# Patient Record
Sex: FEMALE | Race: WHITE | Employment: FULL TIME | URBAN - METROPOLITAN AREA
[De-identification: names, ages, dates, MRNs, and addresses within clinical notes are randomized per-mention and may not be internally consistent; named-entity substitution may affect disease eponyms.]

---

## 2017-02-06 ENCOUNTER — ANESTHESIA EVENT (OUTPATIENT)
Dept: OBGYN | Facility: CLINIC | Age: 37
End: 2017-02-06
Payer: COMMERCIAL

## 2017-02-06 ENCOUNTER — ANESTHESIA (OUTPATIENT)
Dept: OBGYN | Facility: CLINIC | Age: 37
End: 2017-02-06
Payer: COMMERCIAL

## 2017-02-06 PROBLEM — Z98.891 S/P CESAREAN SECTION: Status: ACTIVE | Noted: 2017-02-06

## 2017-02-06 PROCEDURE — 37000011 ZZH ANESTHESIA WARD SERVICE

## 2017-02-06 PROCEDURE — 25000125 ZZHC RX 250: Performed by: ANESTHESIOLOGY

## 2017-02-06 PROCEDURE — 25000128 H RX IP 250 OP 636: Performed by: OBSTETRICS & GYNECOLOGY

## 2017-02-06 PROCEDURE — 25000125 ZZHC RX 250: Performed by: NURSE ANESTHETIST, CERTIFIED REGISTERED

## 2017-02-06 PROCEDURE — 25000128 H RX IP 250 OP 636: Performed by: NURSE ANESTHETIST, CERTIFIED REGISTERED

## 2017-02-06 PROCEDURE — 25800025 ZZH RX 258: Performed by: OBSTETRICS & GYNECOLOGY

## 2017-02-06 PROCEDURE — 25000134 H RX MED IP 250 OP 636 PS 250: Performed by: NURSE ANESTHETIST, CERTIFIED REGISTERED

## 2017-02-06 PROCEDURE — 25000128 H RX IP 250 OP 636: Performed by: ANESTHESIOLOGY

## 2017-02-06 PROCEDURE — 25000125 ZZHC RX 250: Performed by: OBSTETRICS & GYNECOLOGY

## 2017-02-06 RX ORDER — MORPHINE SULFATE 0.5 MG/ML
INJECTION, SOLUTION EPIDURAL; INTRATHECAL; INTRAVENOUS PRN
Status: DISCONTINUED | OUTPATIENT
Start: 2017-02-06 | End: 2017-02-06

## 2017-02-06 RX ORDER — LIDOCAINE HCL/EPINEPHRINE/PF 2%-1:200K
VIAL (ML) INJECTION PRN
Status: DISCONTINUED | OUTPATIENT
Start: 2017-02-06 | End: 2017-02-06

## 2017-02-06 RX ADMIN — PHENYLEPHRINE HYDROCHLORIDE 100 MCG: 10 INJECTION, SOLUTION INTRAMUSCULAR; INTRAVENOUS; SUBCUTANEOUS at 18:55

## 2017-02-06 RX ADMIN — PHENYLEPHRINE HYDROCHLORIDE 100 MCG: 10 INJECTION, SOLUTION INTRAMUSCULAR; INTRAVENOUS; SUBCUTANEOUS at 19:24

## 2017-02-06 RX ADMIN — ONDANSETRON 4 MG: 2 INJECTION INTRAMUSCULAR; INTRAVENOUS at 18:15

## 2017-02-06 RX ADMIN — FENTANYL CITRATE 100 MCG: 50 INJECTION, SOLUTION INTRAMUSCULAR; INTRAVENOUS at 09:37

## 2017-02-06 RX ADMIN — PHENYLEPHRINE HYDROCHLORIDE 200 MCG: 10 INJECTION, SOLUTION INTRAMUSCULAR; INTRAVENOUS; SUBCUTANEOUS at 19:13

## 2017-02-06 RX ADMIN — PHENYLEPHRINE HYDROCHLORIDE 100 MCG: 10 INJECTION, SOLUTION INTRAMUSCULAR; INTRAVENOUS; SUBCUTANEOUS at 19:06

## 2017-02-06 RX ADMIN — Medication 340 ML: at 18:37

## 2017-02-06 RX ADMIN — ROPIVACAINE HYDROCHLORIDE 7 ML: 2 INJECTION, SOLUTION EPIDURAL; INFILTRATION at 09:37

## 2017-02-06 RX ADMIN — PHENYLEPHRINE HYDROCHLORIDE 200 MCG: 10 INJECTION, SOLUTION INTRAMUSCULAR; INTRAVENOUS; SUBCUTANEOUS at 19:15

## 2017-02-06 RX ADMIN — PHENYLEPHRINE HYDROCHLORIDE 150 MCG: 10 INJECTION, SOLUTION INTRAMUSCULAR; INTRAVENOUS; SUBCUTANEOUS at 18:52

## 2017-02-06 RX ADMIN — MORPHINE SULFATE 1.5 MG: 0.5 INJECTION, SOLUTION EPIDURAL; INTRATHECAL; INTRAVENOUS at 18:45

## 2017-02-06 RX ADMIN — CEFAZOLIN SODIUM 2 G: 2 INJECTION, SOLUTION INTRAVENOUS at 18:14

## 2017-02-06 RX ADMIN — SODIUM CHLORIDE, POTASSIUM CHLORIDE, SODIUM LACTATE AND CALCIUM CHLORIDE: 600; 310; 30; 20 INJECTION, SOLUTION INTRAVENOUS at 18:46

## 2017-02-06 RX ADMIN — Medication 100 ML: at 19:13

## 2017-02-06 RX ADMIN — LIDOCAINE HYDROCHLORIDE,EPINEPHRINE BITARTRATE 11 ML: 20; .005 INJECTION, SOLUTION EPIDURAL; INFILTRATION; INTRACAUDAL; PERINEURAL at 18:14

## 2017-02-06 RX ADMIN — PHENYLEPHRINE HYDROCHLORIDE 100 MCG: 10 INJECTION, SOLUTION INTRAMUSCULAR; INTRAVENOUS; SUBCUTANEOUS at 18:57

## 2017-02-06 ASSESSMENT — LIFESTYLE VARIABLES: TOBACCO_USE: 0

## 2017-02-06 ASSESSMENT — COPD QUESTIONNAIRES: COPD: 0

## 2017-02-06 NOTE — ANESTHESIA PROCEDURE NOTES
Peripheral nerve/Neuraxial procedure note : epidural catheter  Pre-Procedure  Performed by TETO LEW  Location: OB      Pre-Anesthestic Checklist: patient identified, IV checked, risks and benefits discussed, informed consent, monitors and equipment checked, pre-op evaluation and at physician/surgeon's request    Timeout  Correct Patient: Yes   Correct Procedure: Yes   Correct Site: Yes   Correct Laterality: N/A   Correct Position: Yes   Site Marked: N/A   .   Procedure Documentation  ASA 2  .    Procedure:    Epidural catheter.  Insertion Site:L4-5  (midline approach) Injection technique: LORT saline   Local skin infiltrated with 3 mL of 1% lidocaine.  JONHATHON at 4.5 cm     Patient Prep;mask, sterile gloves, povidone-iodine 7.5% surgical scrub, patient draped.  .  Needle: Touhy needle (17 G. 3.5 in). # of attempts: 1.  # of redirects: 1.  Spinal Needle: . . . Catheter: 19 G .  .  .  Catheter threaded easily, 8 at the skin and 3.5 cm in the epidural space.     Assessment/Narrative  Paresthesias: No.  .  .  Aspiration negative for heme or CSF  . Test dose of 5 mL lidocaine 1.5% w/ 1:200,000 epinephrine at. Test dose negative for signs of intravascular, subdural or intrathecal injection.

## 2017-02-06 NOTE — ANESTHESIA PREPROCEDURE EVALUATION
Anesthesia Evaluation       history and physical reviewed .      No history of anesthetic complications     ROS/MED HX    ENT/Pulmonary:  - neg pulmonary ROS     Neurologic:  - neg neurologic ROS     Cardiovascular:  - neg cardiovascular ROS       METS/Exercise Tolerance:     Hematologic:         Musculoskeletal:         GI/Hepatic:  - neg GI/hepatic ROS       Renal/Genitourinary:         Endo:         Psychiatric:         Infectious Disease:         Malignancy:         Other:               Physical Exam  Normal systems: cardiovascular, pulmonary and dental    Airway   Mallampati: II  TM distance: > 3 FB  Neck ROM: full  Mouth opening: > 3 cm    Dental     Cardiovascular       Pulmonary           neg OB ROS                 Anesthesia Plan      History & Physical Review      ASA Status:  .  OB Epidural Asa: 2            Postoperative Care      Consents  Anesthetic plan, risks, benefits and alternatives discussed with:  Patient..                          .

## 2017-02-07 NOTE — ANESTHESIA POSTPROCEDURE EVALUATION
Patient: Sonya Boyd    Procedure(s):   - Wound Class: I-Clean    Diagnosis:pregnancy-failure to progress/fetal intolerance to labor  Diagnosis Additional Information: No value filed.    Anesthesia Type:  Epidural    Note:  Anesthesia Post Evaluation    Patient location during evaluation: PACU  Patient participation: Able to fully participate in evaluation  Level of consciousness: awake and alert  Pain management: adequate  Airway patency: patent  Respiratory status: acceptable  Hydration status: acceptable  PONV: controlled     Anesthetic complications: None          Last vitals:  Filed Vitals:    02/06/17 1700 02/06/17 1713 02/06/17 1940   BP: 126/69  108/46   Temp: 37.4  C (99.4  F) 36.3  C (97.3  F)    Resp:   16   SpO2:   100%         Electronically Signed By: Cj Gutierrez MD  February 6, 2017  8:26 PM

## 2017-02-07 NOTE — ANESTHESIA CARE TRANSFER NOTE
Patient: Sonya Boyd    Procedure(s):   - Wound Class: I-Clean    Diagnosis: pregnancy-failure to progress/fetal intolerance to labor  Diagnosis Additional Information: No value filed.    Anesthesia Type:   Epidural     Note:      Comments: Transferred to OB PACU recovery in hospital bed with siderails up, spontaneous respirations on room air with oxygen saturations >95%. SpO2, NiBP and EKG monitors connected in OB PACU and alarms on and functioning. Patient clinically stable. Report given to OB PACU RN and questions answered. Oxytocin 30 units in 500mL infusion connected to IV infusion pump in recovery bay and programmed to 100 mL/hr at handoff of care.      Vitals: (Last set prior to Anesthesia Care Transfer)    CRNA VITALS  2/6/2017 1905 - 2/6/2017 1943      2/6/2017             Resp Rate (set): 10                Electronically Signed By: DERECK Andrews CRNA  February 6, 2017  7:40 PM

## 2017-02-07 NOTE — ANESTHESIA PREPROCEDURE EVALUATION
Anesthesia Evaluation     . Pt has had prior anesthetic.     No history of anesthetic complications     ROS/MED HX    ENT/Pulmonary:      (-) tobacco use, asthma, COPD and sleep apnea   Neurologic:       Cardiovascular:        (-) hypertension, CAD and dyslipidemia   METS/Exercise Tolerance:     Hematologic:         Musculoskeletal:         GI/Hepatic:        (-) GERD and liver disease   Renal/Genitourinary:      (-) renal disease   Endo:     (+) thyroid problem hypothyroidism, .      Psychiatric:         Infectious Disease:         Malignancy:         Other:               Physical Exam  Normal systems: cardiovascular, pulmonary and dental    Airway   Mallampati: III  TM distance: >3 FB  Neck ROM: full    Dental     Cardiovascular       Pulmonary                     Anesthesia Plan      History & Physical Review  History and physical reviewed and following examination; no interval change.    ASA Status:  2 emergent.    NPO Status:  Waived due to emergency    Plan for Epidural   PONV prophylaxis:  Ondansetron (or other 5HT-3)       Postoperative Care      Consents  Anesthetic plan, risks, benefits and alternatives discussed with:  Patient..                          .

## 2017-02-08 NOTE — ANESTHESIA POSTPROCEDURE EVALUATION
Patient: Sonya Boyd    * No procedures listed *    Diagnosis:* No pre-op diagnosis entered *  Diagnosis Additional Information: No value filed.    Anesthesia Type:  No value filed.    Note:  Anesthesia Post Evaluation    Patient location during evaluation: Floor  Patient participation: Able to fully participate in evaluation     Anesthetic complications: None    Comments: Pt doing well.  Denies epidural-related complaints.        Last vitals:  Filed Vitals:    02/07/17 1730 02/07/17 1745 02/07/17 1758   BP: 95/63 97/66 98/67   Temp:  36.6  C (97.9  F) 36.6  C (97.8  F)   Resp: 16 16 16   SpO2:            Electronically Signed By: SAVANNAH SUTTON MD  February 7, 2017  9:25 PM

## 2017-07-27 ENCOUNTER — OFFICE VISIT (OUTPATIENT)
Dept: URGENT CARE | Facility: URGENT CARE | Age: 37
End: 2017-07-27
Payer: COMMERCIAL

## 2017-07-27 VITALS
TEMPERATURE: 98.5 F | HEART RATE: 72 BPM | SYSTOLIC BLOOD PRESSURE: 126 MMHG | DIASTOLIC BLOOD PRESSURE: 82 MMHG | OXYGEN SATURATION: 100 %

## 2017-07-27 DIAGNOSIS — B02.9 HERPES ZOSTER WITHOUT COMPLICATION: Primary | ICD-10-CM

## 2017-07-27 PROCEDURE — 99202 OFFICE O/P NEW SF 15 MIN: CPT

## 2017-07-27 RX ORDER — VALACYCLOVIR HYDROCHLORIDE 1 G/1
1000 TABLET, FILM COATED ORAL 2 TIMES DAILY
Qty: 20 TABLET | Refills: 0 | Status: ON HOLD | OUTPATIENT
Start: 2017-07-27 | End: 2019-12-27

## 2017-07-27 NOTE — MR AVS SNAPSHOT
"              After Visit Summary   2017    Sonya Boyd    MRN: [id number]           Patient Information     Date Of Birth          1980        Visit Information        Provider Department      2017 7:00 PM CS URGENT CARE [facility]        Today's Diagnoses     Herpes zoster without complication    -  1       Follow-ups after your visit        Who to contact     If you have questions or need follow up information about today's clinic visit or your schedule please contact [facility] directly at [phone].  Normal or non-critical lab and imaging results will be communicated to you by Web Performancehart, letter or phone within 4 business days after the clinic has received the results. If you do not hear from us within 7 days, please contact the clinic through M-Filest or phone. If you have a critical or abnormal lab result, we will notify you by phone as soon as possible.  Submit refill requests through Nubee or call your pharmacy and they will forward the refill request to us. Please allow 3 business days for your refill to be completed.          Additional Information About Your Visit        MyChart Information     Nubee lets you send messages to your doctor, view your test results, renew your prescriptions, schedule appointments and more. To sign up, go to [email or web address] . Click on \"Log in\" on the left side of the screen, which will take you to the Welcome page. Then click on \"Sign up Now\" on the right side of the page.     You will be asked to enter the access code listed below, as well as some personal information. Please follow the directions to create your username and password.     Your access code is: [email or web address]  Expires: 10/25/2017  8:24 PM     Your access code will  in 90 days. If you need help or a new code, please call your [facility] or [phone].        Care EveryWhere ID     This is your Care EveryWhere ID. This " could be used by other organizations to access your [facility] medical records  [id number]        Your Vitals Were     Pulse Temperature Pulse Oximetry Breastfeeding?          72 98.5  F (36.9  C) (Oral) 100% Yes         Blood Pressure from Last 3 Encounters:   07/27/17 126/82   02/10/17 138/85   01/14/16 103/57    Weight from Last 3 Encounters:   02/06/17 143 lb (64.9 kg)   01/13/16 112 lb (50.8 kg)              Today, you had the following     No orders found for display         Today's Medication Changes          These changes are accurate as of: 7/27/17  8:24 PM.  If you have any questions, ask your nurse or doctor.               Start taking these medicines.        Dose/Directions    valACYclovir 1000 mg tablet   Commonly known as:  VALTREX   Used for:  Herpes zoster without complication        Dose:  1000 mg   Take 1 tablet (1,000 mg) by mouth 2 times daily   Quantity:  20 tablet   Refills:  0         These medicines have changed or have updated prescriptions.        Dose/Directions    levothyroxine 125 MCG tablet   Commonly known as:  SYNTHROID   This may have changed:  how much to take   Used for:  Other specified hypothyroidism        Dose:  125 mcg   Take 1 tablet (125 mcg) by mouth daily   Quantity:  30 tablet   Refills:  0            Where to get your medicines      These medications were sent to [facility] [address] [place] [address] [address] [place] [address] & [address] [address]    Hours:  24-hours Phone:  [phone]     valACYclovir 1000 mg tablet                Primary Care Provider Office Phone # Fax #    Poornima Bolanos [phone]       [address] [address]        Equal Access to Services     CORTES JEAN : Sanjuana Coto, amanda awad, qaybisak pritchett. So [facility] [phone].    ATENCIÓN: Si habla español, tiene a plummer disposición servicios  sophia de asistencia lingüística. Stalin maxwell [phone].    We comply with applicable federal civil rights laws and [place] laws. We do not discriminate on the basis of race, color, national origin, age, disability sex, sexual orientation or gender identity.            Thank you!     Thank you for choosing [facility]  for your care. Our goal is always to provide you with excellent care. Hearing back from our patients is one way we can continue to improve our services. Please take a few minutes to complete the written survey that you may receive in the mail after your visit with us. Thank you!             Your Updated Medication List - Protect others around you: Learn how to safely use, store and throw away your medicines at www.disposemymeds.org.          This list is accurate as of: 17  8:24 PM.  Always use your most recent med list.                   Brand Name Dispense Instructions for use Diagnosis    acetaminophen 500 MG tablet    TYLENOL    60 tablet    Take 1-2 tablets (500-1,000 mg) by mouth every 6 hours as needed for mild pain    S/P  section       ferrous sulfate 325 (65 FE) MG tablet    IRON    60 tablet    Take 1 tablet (325 mg) by mouth 2 times daily    Complete spontaneous        ibuprofen 600 MG tablet    ADVIL/MOTRIN    60 tablet    Take 1 tablet (600 mg) by mouth every 6 hours as needed for pain    S/P  section       levothyroxine 125 MCG tablet    SYNTHROID    30 tablet    Take 1 tablet (125 mcg) by mouth daily    Other specified hypothyroidism       LEXAPRO PO      Take 5 mg by mouth daily        oxyCODONE 5 MG IR tablet    ROXICODONE    40 tablet    Take 1-2 tablets (5-10 mg) by mouth every 4 hours as needed for moderate to severe pain    S/P  section       polyethylene glycol Packet    MIRALAX/GLYCOLAX    15 packet    Take 17 g by mouth daily as needed for constipation    Complete spontaneous        prenatal multivitamin   plus iron 27-0.8 MG Tabs per tablet      Take 1 tablet by mouth daily        senna-docusate 8.6-50 MG per tablet    SENOKOT-S;PERICOLACE    60 tablet    Take 1 tablet by mouth 2 times daily    S/P  section       valACYclovir 1000 mg tablet    VALTREX    20 tablet    Take 1 tablet (1,000 mg) by mouth 2 times daily    Herpes zoster without complication

## 2017-07-28 NOTE — PROGRESS NOTES
[facility] Progress Note        Renato Yu MD, MPH  07/27/2017    Sonya Boyd is a pleasant 36 year old female seen for a non-pruritic rash involving left lower back and left leg since yesterday. She also refers to a tingly pain involving left lower extremity w/o numbness or weakness x 4 days.   There has not been any change in the diet or new detergent, soap or toiletries.  No new medications, no insect bite.  No fever or chills and no recent illness.  No cough or shortness of breath or wheezing is referred.  No nausea, vomiting or diarrhea.  No arthralgia or myalgia.  No tongue or lip swelling or swallowing problems.    Physical Exam   /82 (BP Location: Left arm, Cuff Size: Adult Regular)  Pulse 72  Temp 98.5  F (36.9  C) (Oral)  SpO2 100%  Breastfeeding? Yes     Constitutional: Patient is in no distress The patient is pleasant and cooperative.   HEENT: Head:  Head is atraumatic, normocephalic.    Eyes: Pupils are equal, round and reactive to light and accomodation.  Sclera is non-icteric. No conjunctival injection, or exudate noted. Extraocular motion is intact. Visual acuity is intact bilaterally.  Ears:  External acoustic canals are patent and clear.  There is no erythema and bulging( exudate)  of the ( R/L ) tympanic membrane(s ).   Nose:  No Nasal congestion w/o drainage or mucosal ulceration is noted.  Throat:  Oral mucosa is moist.  No oral lesions are noted.  No posterior pharyngeal hyperemia or exudate noted.     Neck Supple.  There is no cervical lymphadenopathy.  No nuchal rigidity noted.  There is no meningismus.     Cardiovascular: Heart is regular to rate and rhythm.  No murmur is noted.     Chest. Chest Symmetrical, no soft tissues, swelling, or tenderness upon palpation   Lungs: Clear in the anterior and posterior pulmonary fields.   Abdomen: Soft and non-tender.    Back No flank tenderness is noted.   Extremeties No edema, no calf tenderness.   Neuro: No  focal deficit. Good ROM of the lower extremities.   Skin No petechiae or purpura is noted.  There is a vesicular and non-pustular rash involving left lower back as well as left lower extremity. No cellulitis or lymphangitis is noted.    Mood Normal         Herpes zoster w/o complication:  Valtrex 1 gram po BID x 10 days. No refill.  Follow-up with your PCP in 3-4 days days, earlier if symptoms worsen.  Advised to keep the area of rash covered.  Discussed the contagious nature of this illness especially to susceptible people such as infants,elderly and immunocompromised.  Tylenol 650 mg po q 6 hours as needed alternating w ibuprofen 400 mg po q 6 hours as needed for pain.

## 2017-07-28 NOTE — NURSING NOTE
"Chief Complaint   Patient presents with     Urgent Care     Derm Problem     Rash and burning pain along left leg and lower back that started Saturday.        Initial /82 (BP Location: Left arm, Cuff Size: Adult Regular)  Pulse 72  Temp 98.5  F (36.9  C) (Oral)  SpO2 100%  Breastfeeding? Yes Estimated body mass index is 27.02 kg/(m^2) as calculated from the following:    Height as of 2/6/17: 5' 1\" (1.549 m).    Weight as of 2/6/17: 143 lb (64.9 kg).  Medication Reconciliation: complete.  GIDEON Felder      "

## 2019-04-23 LAB
HBV SURFACE AG SERPL QL IA: NORMAL
RUBELLA ANTIBODY IGG QUANTITATIVE: NORMAL IU/ML

## 2019-06-19 ENCOUNTER — MEDICAL CORRESPONDENCE (OUTPATIENT)
Dept: HEALTH INFORMATION MANAGEMENT | Facility: CLINIC | Age: 39
End: 2019-06-19

## 2019-06-19 ENCOUNTER — TRANSFERRED RECORDS (OUTPATIENT)
Dept: HEALTH INFORMATION MANAGEMENT | Facility: CLINIC | Age: 39
End: 2019-06-19

## 2019-06-20 ENCOUNTER — TRANSCRIBE ORDERS (OUTPATIENT)
Dept: MATERNAL FETAL MEDICINE | Facility: CLINIC | Age: 39
End: 2019-06-20

## 2019-06-20 DIAGNOSIS — O26.90 PREGNANCY RELATED CONDITION, ANTEPARTUM: Primary | ICD-10-CM

## 2019-08-01 ENCOUNTER — PRE VISIT (OUTPATIENT)
Dept: MATERNAL FETAL MEDICINE | Facility: CLINIC | Age: 39
End: 2019-08-01

## 2019-08-08 ENCOUNTER — HOSPITAL ENCOUNTER (OUTPATIENT)
Dept: ULTRASOUND IMAGING | Facility: CLINIC | Age: 39
Discharge: HOME | End: 2019-08-08
Attending: OBSTETRICS & GYNECOLOGY | Admitting: OBSTETRICS & GYNECOLOGY
Payer: COMMERCIAL

## 2019-08-08 ENCOUNTER — OFFICE VISIT (OUTPATIENT)
Dept: MATERNAL FETAL MEDICINE | Facility: CLINIC | Age: 39
End: 2019-08-08
Attending: OBSTETRICS & GYNECOLOGY
Payer: COMMERCIAL

## 2019-08-08 DIAGNOSIS — O09.522 MULTIGRAVIDA OF ADVANCED MATERNAL AGE IN SECOND TRIMESTER: Primary | ICD-10-CM

## 2019-08-08 DIAGNOSIS — O26.90 PREGNANCY RELATED CONDITION, ANTEPARTUM: ICD-10-CM

## 2019-08-08 DIAGNOSIS — O09.522 SUPERVISION OF ELDERLY MULTIGRAVIDA IN SECOND TRIMESTER: Primary | ICD-10-CM

## 2019-08-08 PROCEDURE — 76811 OB US DETAILED SNGL FETUS: CPT

## 2019-08-08 PROCEDURE — 96040 ZZH GENETIC COUNSELING, EACH 30 MINUTES: CPT | Mod: ZF | Performed by: GENETIC COUNSELOR, MS

## 2019-08-08 NOTE — PROGRESS NOTES
"Please see \"Imaging\" tab under \"Chart Review\" for details of today's US.    Cheryl Gutierrez, DO    "

## 2019-08-08 NOTE — LETTER
2019  Sonya Alvarenga   1980              To Whom It May Concern,    Sonya Lizrrilla, ANYI 1980, was seen at Maternal Fetal Medicine today for an 11:45am  appointment due to a pregnancy related condition. Any questions or concerns please contact us further.    Thank you,            Cheryl Gutierrez MD  Maternal Fetal Medicine  [phone]

## 2019-08-08 NOTE — PROGRESS NOTES
[facility]  Genetic Counseling Consult    Patient:  Sonya Boyd YOB: 1980   Date of Service:  19      Sonya Boyd was seen at the [facility] for genetic consultation as part of her appointment for comprehensive ultrasound.  The indication for genetic counseling is advanced maternal age. She was accompanied to today's visit by her partner, Julian.       Impression/Plan:   1. Sonya had a cell-free fetal DNA test earlier in pregnancy, which was normal.    2. Sonya had a comprehensive (level II) ultrasound today.  Please see the ultrasound report for further details.    3. The patient declines genetic amniocentesis and additional maternal serum screening today.    Pregnancy History:   /Parity:    Age at Delivery: 39 year old  FER: 2020, by Last Menstrual Period  Gestational Age: 19w0d    No significant complications or exposures were reported in the current pregnancy.    Sonya boss pregnancy history is significant for:  o 2015: SAB, first trimester  o 2017: 38+6, , female  o 2017: SAB 4-5 weeks gestation    Medical History:   Sonya boss reported medical history is not expected to impact pregnancy management or risks to fetal development. Sonya has hypothyroidism and is taking Synthroid.        Family History:   A three-generation pedigree was obtained in a previous pregnancy, was reviewed today, and is scanned under the  Media  tab.   The following significant findings were reported by Sonya in her last pregnancy:    Sonya's maternal aunt has hydrocephalus. See genetic consult note dated 2016.    Sonya's paternal first cousin has a cognitive disability.     Otherwise, the reported family history is negative for multiple miscarriages, stillbirths, birth defects, mental retardation, known genetic conditions, and consanguinity.       Carrier Screening:       Expanded  carrier screening for mutations in a large panel of genes associated with autosomal recessive conditions including cystic fibrosis, spinal muscular atrophy, and others, is now available.      If Sonya and/or her partner choose to pursue carrier screening, [facility] clinic is available to assist in coordination if desired.        Risk Assessment for Chromosome Conditions:   We explained that the risk for fetal chromosome abnormalities increases with maternal age. We discussed specific features of common chromosome abnormalities, including Down syndrome, trisomy 13, trisomy 18, and sex chromosome trisomies.      - At age 39 at midtrimester, the risk to have a baby with Down syndrome is 1 in 98.     - At age 39 at midtrimester, the risk to have a baby with any chromosome abnormality is 1 in 51.       Sonya had maternal serum screening earlier in pregnancy.     Non-invasive Prenatal Testing (NIPT)    Maternal plasma cell-free DNA testing    Screens for fetal trisomy 21, trisomy 13, trisomy 18, and sex chromosome aneuploidy    First trimester ultrasound with nuchal translucency and nasal bone assessment was not performed in this pregnancy, to our knowledge.    Sonya had an InformaSeq test earlier in pregnancy; we reviewed the results today, which are normal for chromosome 13, chromosome 18 and chromosome 21 (no aneuploidy detected)    Given the accuracy of this test, these results greatly decrease the chance for certain fetal chromosome abnormalities    We discussed the limitations of normal NIPT results    MSAFP (after 15 weeks for open neural tube defect screening) results were not available for our review today.       Testing Options:   We discussed the following options:   Comprehensive (Level II) ultrasound: Detailed ultrasound performed between 18-22 weeks gestation to screen for major birth defects and markers for aneuploidy.        We reviewed the benefits and limitations of this testing.  Screening tests provide a  risk assessment specific to the pregnancy for certain fetal chromosome abnormalities, but cannot definitively diagnose or exclude a fetal chromosome abnormality.  Follow-up genetic counseling and consideration of diagnostic testing is recommended with any abnormal screening result.     Diagnostic tests carry inherent risks- including risk of miscarriage- that require careful consideration.  These tests can detect fetal chromosome abnormalities with greater than 99% certainty.  Results can be compromised by maternal cell contamination or mosaicism, and are limited by the resolution of cytogenetic G-banding technology.  There is no screening nor diagnostic test that can detect all forms of birth defects or mental disability.    It was a pleasure to be involved with [facility]. Face-to-face time of the meeting was 20 minutes.      Janie Guevara MS, [facility]  Maternal Fetal Medicine  [facility]  Ph: [phone]  [email or web address]

## 2019-12-05 LAB — GROUP B STREP PCR: POSITIVE

## 2019-12-27 ENCOUNTER — HOSPITAL ENCOUNTER (INPATIENT)
Facility: CLINIC | Age: 39
LOS: 3 days | Discharge: HOME | End: 2019-12-30
Attending: OBSTETRICS & GYNECOLOGY | Admitting: OBSTETRICS & GYNECOLOGY
Payer: COMMERCIAL

## 2019-12-27 ENCOUNTER — ANESTHESIA EVENT (OUTPATIENT)
Dept: OBGYN | Facility: CLINIC | Age: 39
End: 2019-12-27
Payer: COMMERCIAL

## 2019-12-27 ENCOUNTER — ANESTHESIA (OUTPATIENT)
Dept: OBGYN | Facility: CLINIC | Age: 39
End: 2019-12-27
Payer: COMMERCIAL

## 2019-12-27 DIAGNOSIS — Z98.891 S/P CESAREAN SECTION: Primary | ICD-10-CM

## 2019-12-27 LAB
ABO + RH BLD: NORMAL
ABO + RH BLD: NORMAL
BLD GP AB SCN SERPL QL: NORMAL
BLOOD BANK CMNT PATIENT-IMP: NORMAL
HGB BLD-MCNC: 12 G/DL (ref 11.7–15.7)
SPECIMEN EXP DATE BLD: NORMAL
T PALLIDUM AB SER QL: NONREACTIVE

## 2019-12-27 PROCEDURE — 25000128 H RX IP 250 OP 636: Performed by: ANESTHESIOLOGY

## 2019-12-27 PROCEDURE — 36415 COLL VENOUS BLD VENIPUNCTURE: CPT | Performed by: OBSTETRICS & GYNECOLOGY

## 2019-12-27 PROCEDURE — 25000125 ZZHC RX 250: Performed by: NURSE ANESTHETIST, CERTIFIED REGISTERED

## 2019-12-27 PROCEDURE — 25000132 ZZH RX MED GY IP 250 OP 250 PS 637: Performed by: OBSTETRICS & GYNECOLOGY

## 2019-12-27 PROCEDURE — 25000566 ZZH SEVOFLURANE, EA 15 MIN: Performed by: OBSTETRICS & GYNECOLOGY

## 2019-12-27 PROCEDURE — 25800030 ZZH RX IP 258 OP 636: Performed by: NURSE ANESTHETIST, CERTIFIED REGISTERED

## 2019-12-27 PROCEDURE — 25800030 ZZH RX IP 258 OP 636: Performed by: OBSTETRICS & GYNECOLOGY

## 2019-12-27 PROCEDURE — 86780 TREPONEMA PALLIDUM: CPT | Performed by: OBSTETRICS & GYNECOLOGY

## 2019-12-27 PROCEDURE — 85018 HEMOGLOBIN: CPT | Performed by: OBSTETRICS & GYNECOLOGY

## 2019-12-27 PROCEDURE — 86901 BLOOD TYPING SEROLOGIC RH(D): CPT | Performed by: OBSTETRICS & GYNECOLOGY

## 2019-12-27 PROCEDURE — 12000035 ZZH R&B POSTPARTUM

## 2019-12-27 PROCEDURE — 25000128 H RX IP 250 OP 636: Performed by: OBSTETRICS & GYNECOLOGY

## 2019-12-27 PROCEDURE — 71000012 ZZH RECOVERY PHASE 1 LEVEL 1 FIRST HR: Performed by: OBSTETRICS & GYNECOLOGY

## 2019-12-27 PROCEDURE — 25000132 ZZH RX MED GY IP 250 OP 250 PS 637

## 2019-12-27 PROCEDURE — 86850 RBC ANTIBODY SCREEN: CPT | Performed by: OBSTETRICS & GYNECOLOGY

## 2019-12-27 PROCEDURE — 36000056 ZZH SURGERY LEVEL 3 1ST 30 MIN: Performed by: OBSTETRICS & GYNECOLOGY

## 2019-12-27 PROCEDURE — 25000125 ZZHC RX 250: Performed by: OBSTETRICS & GYNECOLOGY

## 2019-12-27 PROCEDURE — 36000058 ZZH SURGERY LEVEL 3 EA 15 ADDTL MIN: Performed by: OBSTETRICS & GYNECOLOGY

## 2019-12-27 PROCEDURE — 37000009 ZZH ANESTHESIA TECHNICAL FEE, EACH ADDTL 15 MIN: Performed by: OBSTETRICS & GYNECOLOGY

## 2019-12-27 PROCEDURE — 86900 BLOOD TYPING SEROLOGIC ABO: CPT | Performed by: OBSTETRICS & GYNECOLOGY

## 2019-12-27 PROCEDURE — 71000013 ZZH RECOVERY PHASE 1 LEVEL 1 EA ADDTL HR: Performed by: OBSTETRICS & GYNECOLOGY

## 2019-12-27 PROCEDURE — 37000008 ZZH ANESTHESIA TECHNICAL FEE, 1ST 30 MIN: Performed by: OBSTETRICS & GYNECOLOGY

## 2019-12-27 PROCEDURE — 25000128 H RX IP 250 OP 636: Performed by: NURSE ANESTHETIST, CERTIFIED REGISTERED

## 2019-12-27 PROCEDURE — 27210794 ZZH OR GENERAL SUPPLY STERILE: Performed by: OBSTETRICS & GYNECOLOGY

## 2019-12-27 RX ORDER — BUPIVACAINE HYDROCHLORIDE 7.5 MG/ML
INJECTION, SOLUTION INTRASPINAL PRN
Status: DISCONTINUED | OUTPATIENT
Start: 2019-12-27 | End: 2019-12-27

## 2019-12-27 RX ORDER — CITRIC ACID/SODIUM CITRATE 334-500MG
SOLUTION, ORAL ORAL
Status: COMPLETED
Start: 2019-12-27 | End: 2019-12-27

## 2019-12-27 RX ORDER — MORPHINE SULFATE 1 MG/ML
INJECTION, SOLUTION EPIDURAL; INTRATHECAL; INTRAVENOUS
Status: COMPLETED
Start: 2019-12-27 | End: 2019-12-27

## 2019-12-27 RX ORDER — LIDOCAINE 40 MG/G
CREAM TOPICAL
Status: DISCONTINUED | OUTPATIENT
Start: 2019-12-27 | End: 2019-12-30 | Stop reason: HOSPADM

## 2019-12-27 RX ORDER — LIDOCAINE 40 MG/G
CREAM TOPICAL
Status: DISCONTINUED | OUTPATIENT
Start: 2019-12-27 | End: 2019-12-27

## 2019-12-27 RX ORDER — ONDANSETRON 2 MG/ML
4 INJECTION INTRAMUSCULAR; INTRAVENOUS EVERY 6 HOURS PRN
Status: DISCONTINUED | OUTPATIENT
Start: 2019-12-27 | End: 2019-12-30 | Stop reason: HOSPADM

## 2019-12-27 RX ORDER — HYDROMORPHONE HYDROCHLORIDE 1 MG/ML
.3-.5 INJECTION, SOLUTION INTRAMUSCULAR; INTRAVENOUS; SUBCUTANEOUS EVERY 30 MIN PRN
Status: DISCONTINUED | OUTPATIENT
Start: 2019-12-27 | End: 2019-12-30 | Stop reason: HOSPADM

## 2019-12-27 RX ORDER — CEFAZOLIN SODIUM 2 G/100ML
2 INJECTION, SOLUTION INTRAVENOUS
Status: COMPLETED | OUTPATIENT
Start: 2019-12-27 | End: 2019-12-27

## 2019-12-27 RX ORDER — HYDROCORTISONE 2.5 %
CREAM (GRAM) TOPICAL 3 TIMES DAILY PRN
Status: DISCONTINUED | OUTPATIENT
Start: 2019-12-27 | End: 2019-12-30 | Stop reason: HOSPADM

## 2019-12-27 RX ORDER — ACETAMINOPHEN 325 MG/1
TABLET ORAL
Status: COMPLETED
Start: 2019-12-27 | End: 2019-12-27

## 2019-12-27 RX ORDER — AMOXICILLIN 250 MG
1 CAPSULE ORAL 2 TIMES DAILY PRN
Status: DISCONTINUED | OUTPATIENT
Start: 2019-12-27 | End: 2019-12-30 | Stop reason: HOSPADM

## 2019-12-27 RX ORDER — OXYTOCIN/0.9 % SODIUM CHLORIDE 30/500 ML
PLASTIC BAG, INJECTION (ML) INTRAVENOUS
Status: DISCONTINUED
Start: 2019-12-27 | End: 2019-12-27 | Stop reason: HOSPADM

## 2019-12-27 RX ORDER — LEVOTHYROXINE SODIUM 125 UG/1
125 TABLET ORAL DAILY
Status: DISCONTINUED | OUTPATIENT
Start: 2019-12-27 | End: 2019-12-30 | Stop reason: HOSPADM

## 2019-12-27 RX ORDER — NALOXONE HYDROCHLORIDE 0.4 MG/ML
.1-.4 INJECTION, SOLUTION INTRAMUSCULAR; INTRAVENOUS; SUBCUTANEOUS
Status: DISCONTINUED | OUTPATIENT
Start: 2019-12-27 | End: 2019-12-30 | Stop reason: HOSPADM

## 2019-12-27 RX ORDER — DIPHENHYDRAMINE HYDROCHLORIDE 50 MG/ML
25 INJECTION INTRAMUSCULAR; INTRAVENOUS EVERY 6 HOURS PRN
Status: DISCONTINUED | OUTPATIENT
Start: 2019-12-27 | End: 2019-12-30 | Stop reason: HOSPADM

## 2019-12-27 RX ORDER — OXYTOCIN 10 [USP'U]/ML
10 INJECTION, SOLUTION INTRAMUSCULAR; INTRAVENOUS
Status: DISCONTINUED | OUTPATIENT
Start: 2019-12-27 | End: 2019-12-30 | Stop reason: HOSPADM

## 2019-12-27 RX ORDER — SCOLOPAMINE TRANSDERMAL SYSTEM 1 MG/1
1 PATCH, EXTENDED RELEASE TRANSDERMAL ONCE
Status: DISCONTINUED | OUTPATIENT
Start: 2019-12-27 | End: 2019-12-30 | Stop reason: HOSPADM

## 2019-12-27 RX ORDER — DEXTROSE, SODIUM CHLORIDE, SODIUM LACTATE, POTASSIUM CHLORIDE, AND CALCIUM CHLORIDE 5; .6; .31; .03; .02 G/100ML; G/100ML; G/100ML; G/100ML; G/100ML
INJECTION, SOLUTION INTRAVENOUS CONTINUOUS
Status: DISCONTINUED | OUTPATIENT
Start: 2019-12-27 | End: 2019-12-30 | Stop reason: HOSPADM

## 2019-12-27 RX ORDER — KETOROLAC TROMETHAMINE 30 MG/ML
INJECTION, SOLUTION INTRAMUSCULAR; INTRAVENOUS PRN
Status: DISCONTINUED | OUTPATIENT
Start: 2019-12-27 | End: 2019-12-27

## 2019-12-27 RX ORDER — METHYLERGONOVINE MALEATE 0.2 MG/ML
200 INJECTION INTRAVENOUS
Status: DISCONTINUED | OUTPATIENT
Start: 2019-12-27 | End: 2019-12-30 | Stop reason: HOSPADM

## 2019-12-27 RX ORDER — BISACODYL 10 MG
10 SUPPOSITORY, RECTAL RECTAL DAILY PRN
Status: DISCONTINUED | OUTPATIENT
Start: 2019-12-29 | End: 2019-12-30 | Stop reason: HOSPADM

## 2019-12-27 RX ORDER — NALBUPHINE HYDROCHLORIDE 10 MG/ML
2.5-5 INJECTION, SOLUTION INTRAMUSCULAR; INTRAVENOUS; SUBCUTANEOUS EVERY 6 HOURS PRN
Status: DISCONTINUED | OUTPATIENT
Start: 2019-12-27 | End: 2019-12-30 | Stop reason: HOSPADM

## 2019-12-27 RX ORDER — LEVOTHYROXINE SODIUM 125 UG/1
125 TABLET ORAL
Status: ON HOLD | COMMUNITY
End: 2019-12-30

## 2019-12-27 RX ORDER — IBUPROFEN 600 MG/1
600 TABLET, FILM COATED ORAL EVERY 6 HOURS PRN
Status: DISCONTINUED | OUTPATIENT
Start: 2019-12-27 | End: 2019-12-30 | Stop reason: HOSPADM

## 2019-12-27 RX ORDER — CEFAZOLIN SODIUM 2 G/100ML
INJECTION, SOLUTION INTRAVENOUS
Status: DISCONTINUED
Start: 2019-12-27 | End: 2019-12-27 | Stop reason: HOSPADM

## 2019-12-27 RX ORDER — SODIUM CHLORIDE, SODIUM LACTATE, POTASSIUM CHLORIDE, CALCIUM CHLORIDE 600; 310; 30; 20 MG/100ML; MG/100ML; MG/100ML; MG/100ML
INJECTION, SOLUTION INTRAVENOUS CONTINUOUS
Status: DISCONTINUED | OUTPATIENT
Start: 2019-12-27 | End: 2019-12-27

## 2019-12-27 RX ORDER — LEVOTHYROXINE SODIUM 125 UG/1
250 TABLET ORAL
Status: ON HOLD | COMMUNITY
End: 2019-12-30

## 2019-12-27 RX ORDER — ACETAMINOPHEN 325 MG/1
650 TABLET ORAL EVERY 4 HOURS PRN
Status: DISCONTINUED | OUTPATIENT
Start: 2019-12-30 | End: 2019-12-30 | Stop reason: HOSPADM

## 2019-12-27 RX ORDER — CITRIC ACID/SODIUM CITRATE 334-500MG
30 SOLUTION, ORAL ORAL
Status: DISCONTINUED | OUTPATIENT
Start: 2019-12-27 | End: 2019-12-27

## 2019-12-27 RX ORDER — CEFAZOLIN SODIUM 1 G/3ML
1 INJECTION, POWDER, FOR SOLUTION INTRAMUSCULAR; INTRAVENOUS SEE ADMIN INSTRUCTIONS
Status: DISCONTINUED | OUTPATIENT
Start: 2019-12-27 | End: 2019-12-27

## 2019-12-27 RX ORDER — KETOROLAC TROMETHAMINE 30 MG/ML
INJECTION, SOLUTION INTRAMUSCULAR; INTRAVENOUS
Status: COMPLETED
Start: 2019-12-27 | End: 2019-12-27

## 2019-12-27 RX ORDER — OXYTOCIN/0.9 % SODIUM CHLORIDE 30/500 ML
340 PLASTIC BAG, INJECTION (ML) INTRAVENOUS CONTINUOUS PRN
Status: DISCONTINUED | OUTPATIENT
Start: 2019-12-27 | End: 2019-12-30 | Stop reason: HOSPADM

## 2019-12-27 RX ORDER — MISOPROSTOL 200 UG/1
800 TABLET ORAL
Status: DISCONTINUED | OUTPATIENT
Start: 2019-12-27 | End: 2019-12-30 | Stop reason: HOSPADM

## 2019-12-27 RX ORDER — SIMETHICONE 80 MG
80 TABLET,CHEWABLE ORAL 4 TIMES DAILY PRN
Status: DISCONTINUED | OUTPATIENT
Start: 2019-12-27 | End: 2019-12-30 | Stop reason: HOSPADM

## 2019-12-27 RX ORDER — AMOXICILLIN 250 MG
2 CAPSULE ORAL 2 TIMES DAILY PRN
Status: DISCONTINUED | OUTPATIENT
Start: 2019-12-27 | End: 2019-12-30 | Stop reason: HOSPADM

## 2019-12-27 RX ORDER — ONDANSETRON 2 MG/ML
INJECTION INTRAMUSCULAR; INTRAVENOUS
Status: DISCONTINUED
Start: 2019-12-27 | End: 2019-12-27 | Stop reason: HOSPADM

## 2019-12-27 RX ORDER — KETOROLAC TROMETHAMINE 30 MG/ML
30 INJECTION, SOLUTION INTRAMUSCULAR; INTRAVENOUS EVERY 6 HOURS
Status: DISPENSED | OUTPATIENT
Start: 2019-12-27 | End: 2019-12-28

## 2019-12-27 RX ORDER — POLYETHYLENE GLYCOL 3350 17 G/17G
17 POWDER, FOR SOLUTION ORAL DAILY
Status: DISCONTINUED | OUTPATIENT
Start: 2019-12-27 | End: 2019-12-30 | Stop reason: HOSPADM

## 2019-12-27 RX ORDER — OXYTOCIN/0.9 % SODIUM CHLORIDE 30/500 ML
100 PLASTIC BAG, INJECTION (ML) INTRAVENOUS CONTINUOUS
Status: DISCONTINUED | OUTPATIENT
Start: 2019-12-27 | End: 2019-12-30 | Stop reason: HOSPADM

## 2019-12-27 RX ORDER — EPHEDRINE SULFATE 50 MG/ML
5 INJECTION, SOLUTION INTRAMUSCULAR; INTRAVENOUS; SUBCUTANEOUS
Status: DISCONTINUED | OUTPATIENT
Start: 2019-12-27 | End: 2019-12-30 | Stop reason: HOSPADM

## 2019-12-27 RX ORDER — OXYTOCIN/0.9 % SODIUM CHLORIDE 30/500 ML
PLASTIC BAG, INJECTION (ML) INTRAVENOUS PRN
Status: DISCONTINUED | OUTPATIENT
Start: 2019-12-27 | End: 2019-12-27

## 2019-12-27 RX ORDER — ONDANSETRON 4 MG/1
4 TABLET, ORALLY DISINTEGRATING ORAL EVERY 6 HOURS PRN
Status: DISCONTINUED | OUTPATIENT
Start: 2019-12-27 | End: 2019-12-30 | Stop reason: HOSPADM

## 2019-12-27 RX ORDER — ACETAMINOPHEN 325 MG/1
975 TABLET ORAL EVERY 8 HOURS
Status: DISPENSED | OUTPATIENT
Start: 2019-12-27 | End: 2019-12-30

## 2019-12-27 RX ORDER — DIPHENHYDRAMINE HCL 25 MG
25 CAPSULE ORAL EVERY 6 HOURS PRN
Status: DISCONTINUED | OUTPATIENT
Start: 2019-12-27 | End: 2019-12-30 | Stop reason: HOSPADM

## 2019-12-27 RX ORDER — CARBOPROST TROMETHAMINE 250 UG/ML
250 INJECTION, SOLUTION INTRAMUSCULAR
Status: DISCONTINUED | OUTPATIENT
Start: 2019-12-27 | End: 2019-12-30 | Stop reason: HOSPADM

## 2019-12-27 RX ORDER — ACETAMINOPHEN 325 MG/1
975 TABLET ORAL ONCE
Status: COMPLETED | OUTPATIENT
Start: 2019-12-27 | End: 2019-12-27

## 2019-12-27 RX ORDER — LANOLIN 100 %
OINTMENT (GRAM) TOPICAL
Status: DISCONTINUED | OUTPATIENT
Start: 2019-12-27 | End: 2019-12-30 | Stop reason: HOSPADM

## 2019-12-27 RX ORDER — MORPHINE SULFATE 1 MG/ML
INJECTION, SOLUTION EPIDURAL; INTRATHECAL; INTRAVENOUS PRN
Status: DISCONTINUED | OUTPATIENT
Start: 2019-12-27 | End: 2019-12-27

## 2019-12-27 RX ORDER — OXYCODONE HYDROCHLORIDE 5 MG/1
5-10 TABLET ORAL
Status: DISCONTINUED | OUTPATIENT
Start: 2019-12-27 | End: 2019-12-30 | Stop reason: HOSPADM

## 2019-12-27 RX ADMIN — SENNOSIDES AND DOCUSATE SODIUM 1 TABLET: 8.6; 5 TABLET ORAL at 19:40

## 2019-12-27 RX ADMIN — LEVOTHYROXINE SODIUM 125 MCG: 125 TABLET ORAL at 16:07

## 2019-12-27 RX ADMIN — MORPHINE SULFATE 0.2 MG: 1 INJECTION, SOLUTION EPIDURAL; INTRATHECAL; INTRAVENOUS at 09:15

## 2019-12-27 RX ADMIN — Medication 170 ML: at 09:37

## 2019-12-27 RX ADMIN — BUPIVACAINE HYDROCHLORIDE IN DEXTROSE 10.5 MG: 7.5 INJECTION, SOLUTION SUBARACHNOID at 09:15

## 2019-12-27 RX ADMIN — SODIUM CHLORIDE, POTASSIUM CHLORIDE, SODIUM LACTATE AND CALCIUM CHLORIDE: 600; 310; 30; 20 INJECTION, SOLUTION INTRAVENOUS at 09:07

## 2019-12-27 RX ADMIN — SODIUM CHLORIDE, POTASSIUM CHLORIDE, SODIUM LACTATE AND CALCIUM CHLORIDE: 600; 310; 30; 20 INJECTION, SOLUTION INTRAVENOUS at 07:50

## 2019-12-27 RX ADMIN — PHENYLEPHRINE HYDROCHLORIDE 150 MCG: 10 INJECTION INTRAVENOUS at 09:51

## 2019-12-27 RX ADMIN — Medication 100 ML/HR: at 14:10

## 2019-12-27 RX ADMIN — SODIUM CHLORIDE, SODIUM LACTATE, POTASSIUM CHLORIDE, CALCIUM CHLORIDE AND DEXTROSE MONOHYDRATE: 5; 600; 310; 30; 20 INJECTION, SOLUTION INTRAVENOUS at 19:41

## 2019-12-27 RX ADMIN — Medication 330 ML: at 10:07

## 2019-12-27 RX ADMIN — ACETAMINOPHEN 975 MG: 325 TABLET ORAL at 08:22

## 2019-12-27 RX ADMIN — ONDANSETRON 4 MG: 2 INJECTION INTRAMUSCULAR; INTRAVENOUS at 09:22

## 2019-12-27 RX ADMIN — KETOROLAC TROMETHAMINE 30 MG: 30 INJECTION, SOLUTION INTRAMUSCULAR at 10:09

## 2019-12-27 RX ADMIN — SODIUM CITRATE AND CITRIC ACID MONOHYDRATE 30 ML: 500; 334 SOLUTION ORAL at 08:22

## 2019-12-27 RX ADMIN — ACETAMINOPHEN 975 MG: 325 TABLET, FILM COATED ORAL at 08:22

## 2019-12-27 RX ADMIN — ACETAMINOPHEN 975 MG: 325 TABLET, FILM COATED ORAL at 16:57

## 2019-12-27 RX ADMIN — KETOROLAC TROMETHAMINE 30 MG: 30 INJECTION, SOLUTION INTRAMUSCULAR at 22:12

## 2019-12-27 RX ADMIN — PHENYLEPHRINE HYDROCHLORIDE 100 MCG: 10 INJECTION INTRAVENOUS at 09:50

## 2019-12-27 RX ADMIN — CEFAZOLIN SODIUM 2 G: 2 INJECTION, SOLUTION INTRAVENOUS at 09:17

## 2019-12-27 RX ADMIN — PHENYLEPHRINE HYDROCHLORIDE 150 MCG: 10 INJECTION INTRAVENOUS at 09:53

## 2019-12-27 RX ADMIN — PHENYLEPHRINE HYDROCHLORIDE 0.25 MCG/KG/MIN: 10 INJECTION INTRAVENOUS at 09:15

## 2019-12-27 RX ADMIN — KETOROLAC TROMETHAMINE 30 MG: 30 INJECTION, SOLUTION INTRAMUSCULAR at 16:02

## 2019-12-27 ASSESSMENT — ENCOUNTER SYMPTOMS: SEIZURES: 0

## 2019-12-27 ASSESSMENT — LIFESTYLE VARIABLES: TOBACCO_USE: 0

## 2019-12-27 ASSESSMENT — MIFFLIN-ST. JEOR: SCORE: 1215.66

## 2019-12-27 NOTE — ANESTHESIA CARE TRANSFER NOTE
Patient: Sonya Boyd    Procedure(s):  REPEAT  SECTION    Diagnosis: repeat  Diagnosis Additional Information: No value filed.    Anesthesia Type:   Spinal     Note:  Airway :Room Air  Patient transferred to:Labor and Delivery  Comments: Patient stable and transferred to labor and delivery recovery area. VS stable and report given to RN. Handoff Report: Identifed the Patient, Identified the Reponsible Provider, Reviewed the pertinent medical history, Discussed the surgical course, Reviewed Intra-OP anesthesia mangement and issues during anesthesia, Set expectations for post-procedure period and Allowed opportunity for questions and acknowledgement of understanding      Vitals: (Last set prior to Anesthesia Care Transfer)    CRNA VITALS  2019 0944 - 2019 1022      2019             Resp Rate (set):  10                Electronically Signed By: DERECK Purvis CRNA  2019  10:22 AM

## 2019-12-27 NOTE — OP NOTE
Section Operative Note  Date: 2019    Patient: Sonya Boyd   Surgeon: Cailin Hawk MD  Assistant: First ankit Salcido  Pre-Op Diagnosis:    1.  at 39w1d   2. Hypothyroidism   3. Prior  delivery   4. Advanced maternal age  Post-Op Diagnosis:    1.   delivery at 39w1d    2.  As above   Procedure: Repeat low transverse  section via pfannenstiel skin incision with two layer uterine closure.   Anesthesia: Spinal  EBL: 345cc            Complications: None apparent  Specimens: None  Indications: 39 year old admitted as a  at 39w1d for scheduled repeat  section. Pregnancy has been uncomplicated.  The patient was counseled on risks, benefits and alternatives to  delivery. Written informed consent was obtained.   Findings: Live-born male infant from the cephalic presentation born on 2019 at 0935, apgars 8 & 9, weight 7 lb 8 oz. Clear amniotic fluid.  No nuchal cord.  Placenta intact with a 3V cord. Normal uterus, tubes and ovaries. Minimal adhesions.  Procedure: The patient was taken to the operating room where spinal anesthesia was initiated.   She was placed in the dorsal supine position with a leftward tilt. A schaefer catheter was placed. She was then prepped and draped in the usual sterile fashion and a time out was performed. The prior pfannenstiel skin incision was elliptically excised. The fascial incision was extended laterally. The fascia was grasped and elevated, and the superior and inferior aspects were dissected away from the rectus muscles with blunt and sharp dissection.  The rectus muscles were then  at the midline and the peritoneum was entered sharply. The peritoneal incision was extended.  The fetal lie was palpated. The vesicouterine peritoneum was grasped, tented, and incised. A low transverse uterine incision was then made with the scalpel and the incision was extended with cephalocaudad finger fraction. The  bladder blade was removed and the infant was delivered atraumatically. The cord was clamped and cut and the infant was handed off to the waiting team. The placenta was removed with gentle traction on the cord. The uterus was then exteriorized and cleared of all clots and debris. The hysterotomy was then closed with 0-Vicryl in a running fashion. A second imbricating layer was then placed using 0-Monocryl. The hysterotomy was noted to be hemostatic.  The posterior cul-de-sac was then cleared of all clots and debris. The uterus was replaced within the abdomen. The hysterotomy remained hemostatic. The pericolic gutters were cleared.   The peritoneum was closed with 3-0 vicryl. The fascia and rectus muscles were inspected and noted to be hemostatic.  The rectus muscles were reapproximated with 0 vicryl in interrupted sutures. The fascial incision was then closed with 0-Vicryl in a running fashion. The subcutaneous tissues were irrigated and hemostasis achieved with the Bovie cautery.  The subcutaneous tissues were reapproximated with 3-0 vicryl.  The skin was then closed with 4-0 monocryl in a running subcuticular fashion.     All instrument, lap and needle counts were correct x 2. Ancef was given prior to skin incision. The patient tolerated the procedure well and was taken to the PACU in stable condition.     Cailin Hawk MD  Obstetrics, Gynecology, and Infertility

## 2019-12-27 NOTE — ANESTHESIA POSTPROCEDURE EVALUATION
Patient: Sonya Boyd    Procedure(s):  REPEAT  SECTION    Diagnosis:repeat  Diagnosis Additional Information: No value filed.    Anesthesia Type:  Spinal    Note:  Anesthesia Post Evaluation    Patient location during evaluation: OB PACU  Patient participation: Able to fully participate in evaluation  Level of consciousness: awake and alert  Pain management: satisfactory to patient  Airway patency: patent  Cardiovascular status: hemodynamically stable  Respiratory status: acceptable and unassisted  Hydration status: balanced  PONV: none     Anesthetic complications: None          Last vitals:  Vitals:    19 1130 19 1145 19 1200   BP: 96/64 108/70 99/52   Pulse:   54   Resp: 18 16 18   Temp:      SpO2: 96% 95% 97%         Electronically Signed By: Jonnie Coreas MD  2019  12:12 PM
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
## 2019-12-27 NOTE — PLAN OF CARE
Pt and  admitted for pre-op. Pt gives verbal consent to place external monitors. Prenatal reviewed. Admission intake done. Plan of care discussed with pt and . Pt and  agrees.

## 2019-12-27 NOTE — PLAN OF CARE
Vitas stable. Feels well. Pain controlled. Up to bathroom for pericare-and sat up for long period. Abdominal binder or comfort. IV infusing. Catheter draining cy urine.Enjoying clear liquids. Baby sleepy at breast.

## 2019-12-27 NOTE — ANESTHESIA PREPROCEDURE EVALUATION
Anesthesia Pre-Procedure Evaluation    Patient: Sonya Boyd   MRN: [id number] : 1980          Preoperative Diagnosis: repeat    Procedure(s):  REPEAT  SECTION    Past Medical History:   Diagnosis Date     Anxiety      Thyroid disease     hypo     Past Surgical History:   Procedure Laterality Date      SECTION N/A 2017    Procedure:  SECTION;  Surgeon: Lauren Christianson MD;  Location:  L+D     COSMETIC SURGERY      breast implants      ORTHOPEDIC SURGERY      [facility]       Anesthesia Evaluation     . Pt has had prior anesthetic.     No history of anesthetic complications          ROS/MED HX    ENT/Pulmonary:      (-) tobacco use and sleep apnea   Neurologic:      (-) seizures and CVA   Cardiovascular:        (-) hypertension   METS/Exercise Tolerance:     Hematologic:         Musculoskeletal:         GI/Hepatic:        (-) GERD and liver disease   Renal/Genitourinary:      (-) renal disease   Endo:     (+) thyroid problem hypothyroidism, .   (-) Type II DM   Psychiatric:         Infectious Disease:         Malignancy:         Other:                          Physical Exam  Normal systems: cardiovascular, pulmonary and dental    Airway   Mallampati: II  TM distance: >3 FB  Neck ROM: full    Dental     Cardiovascular       Pulmonary             Lab Results   Component Value Date    WBC 14.1 (H) 2017    HGB 12.0 2019    HCT 35.1 2017     (L) 2017     2016    POTASSIUM 3.9 2016    CHLORIDE 107 2016    CO2 25 2016    BUN 10 2016    CR 0.65 2016    GLC 92 2016    ZAHRA 8.6 2016    ALBUMIN 3.8 2016    PROTTOTAL 7.1 2016    ALT 49 2016    AST 32 2016    ALKPHOS 103 2016    BILITOTAL 0.5 2016    PTT 31 2016    INR 1.05 2016    FIBR 281 2016       Preop Vitals  BP Readings from Last 3 Encounters:   19 117/76   17 126/82   02/10/17  "138/85    Pulse Readings from Last 3 Encounters:   12/27/19 86   07/27/17 72   02/09/17 65      Resp Readings from Last 3 Encounters:   12/27/19 16   02/10/17 16   01/14/16 18    SpO2 Readings from Last 3 Encounters:   07/27/17 100%   02/07/17 100%   01/14/16 97%      Temp Readings from Last 1 Encounters:   12/27/19 37.2  C (99  F) (Temporal)    Ht Readings from Last 1 Encounters:   12/27/19 1.549 m (5' 1\")      Wt Readings from Last 1 Encounters:   12/27/19 60.3 kg (133 lb)    Estimated body mass index is 25.13 kg/m  as calculated from the following:    Height as of this encounter: 1.549 m (5' 1\").    Weight as of this encounter: 60.3 kg (133 lb).       Anesthesia Plan      History & Physical Review  History and physical reviewed and following examination; no interval change.    ASA Status:  2 .    NPO Status:  > 8 hours    Plan for Spinal   PONV prophylaxis:  Ondansetron (or other 5HT-3)       Postoperative Care  Postoperative pain management:  Multi-modal analgesia.      Consents  Anesthetic plan, risks, benefits and alternatives discussed with:  Patient and Spouse..                 Jonnie Coreas MD  "
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
## 2019-12-27 NOTE — ANESTHESIA PROCEDURE NOTES
Peripheral nerve/Neuraxial procedure note : intrathecal  Pre-Procedure  Performed by Jonnie Coreas MD  Location: OR, OB      Pre-Anesthestic Checklist: patient identified, IV checked, risks and benefits discussed, informed consent, monitors and equipment checked, pre-op evaluation, at physician/surgeon's request and post-op pain management    Timeout  Correct Patient: Yes   Correct Procedure: Yes   Correct Site: Yes   Correct Laterality: N/A   Correct Position: Yes   Site Marked: N/A   .   Procedure Documentation    .    Procedure: intrathecal, .   Patient Position:sitting Insertion Site:L3-4  (midline approach)     Patient Prep/Sterile Barriers; mask, sterile gloves, povidone-iodine 7.5% surgical scrub, patient draped.  .  Needle:  Spinal Needle (gauge): 25  Spinal/LP Needle Length (inches): 3 # of attempts: 1 and # of redirects:  Introducer used .        Assessment/Narrative  Paresthesias: No.  .  .  clear CSF fluid removed . Comments:  No complications

## 2019-12-27 NOTE — PLAN OF CARE
Transferred from PACU at 1225 by bed with baby in arms accompanied by father. Oriented to room and unit. Safety precautions explained. Feels well. Vitals stable. Will continue to monitor.
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
## 2019-12-28 LAB — HGB BLD-MCNC: 10.9 G/DL (ref 11.7–15.7)

## 2019-12-28 PROCEDURE — 36415 COLL VENOUS BLD VENIPUNCTURE: CPT | Performed by: OBSTETRICS & GYNECOLOGY

## 2019-12-28 PROCEDURE — 25000128 H RX IP 250 OP 636: Performed by: OBSTETRICS & GYNECOLOGY

## 2019-12-28 PROCEDURE — 25000132 ZZH RX MED GY IP 250 OP 250 PS 637: Performed by: OBSTETRICS & GYNECOLOGY

## 2019-12-28 PROCEDURE — 12000035 ZZH R&B POSTPARTUM

## 2019-12-28 PROCEDURE — 85018 HEMOGLOBIN: CPT | Performed by: OBSTETRICS & GYNECOLOGY

## 2019-12-28 RX ADMIN — SENNOSIDES AND DOCUSATE SODIUM 1 TABLET: 8.6; 5 TABLET ORAL at 08:08

## 2019-12-28 RX ADMIN — IBUPROFEN 600 MG: 600 TABLET ORAL at 15:47

## 2019-12-28 RX ADMIN — KETOROLAC TROMETHAMINE 30 MG: 30 INJECTION, SOLUTION INTRAMUSCULAR at 04:06

## 2019-12-28 RX ADMIN — LEVOTHYROXINE SODIUM 125 MCG: 125 TABLET ORAL at 06:37

## 2019-12-28 RX ADMIN — OXYCODONE HYDROCHLORIDE 5 MG: 5 TABLET ORAL at 23:01

## 2019-12-28 RX ADMIN — ACETAMINOPHEN 975 MG: 325 TABLET, FILM COATED ORAL at 09:23

## 2019-12-28 RX ADMIN — ACETAMINOPHEN 975 MG: 325 TABLET, FILM COATED ORAL at 17:06

## 2019-12-28 RX ADMIN — IBUPROFEN 600 MG: 600 TABLET ORAL at 09:24

## 2019-12-28 RX ADMIN — ACETAMINOPHEN 975 MG: 325 TABLET, FILM COATED ORAL at 01:04

## 2019-12-28 RX ADMIN — SENNOSIDES AND DOCUSATE SODIUM 1 TABLET: 8.6; 5 TABLET ORAL at 19:47

## 2019-12-28 RX ADMIN — POLYETHYLENE GLYCOL 3350 17 G: 17 POWDER, FOR SOLUTION ORAL at 08:08

## 2019-12-28 RX ADMIN — OXYCODONE HYDROCHLORIDE 5 MG: 5 TABLET ORAL at 17:07

## 2019-12-28 NOTE — LACTATION NOTE
Initial Lactation visit with Sonya & family. Sonya reports feeding is going well so far. She shared that breastfeeding was challenging initially with her first child, but went well and she was able to breastfeed for over a year. She is happy breastfeeding is going well this time and has no concerns. Reviewed likelihood of cluster feeding overnight tonight. Reviewed typical early infant feeding patterns as a review and reviewed typical onset of milk supply and production.     Recommend unlimited, frequent breast feedings: At least 8 - 12 times every 24 hours. Recommended rooming in. Instructed in hand expression. Avoid pacifiers and supplementation with formula unless medically indicated. Explained benefits of holding baby skin on skin to help promote better breastfeeding outcomes. Sonya appreciative of visit. Will revisit as needed.    Holly Song, BSN, PHN, RN-C, MNN, IBCLC

## 2019-12-28 NOTE — PROGRESS NOTES
"[facility] Obstetrics Post-Op Progress Note  12/28/2019    S: Doing well.  Pain is well controlled. Bleeding Light. Infant is being .  Ambulatory.  Tolerating regular diet. Voiding spontaneously. No flatus yet, no BM yet.     O:  /66   Pulse 58   Temp 97.6  F (36.4  C) (Oral)   Resp 16   Ht 1.549 m (5' 1\")   Wt 60.3 kg (133 lb)   LMP 03/28/2019   SpO2 98%   Breastfeeding Unknown   BMI 25.13 kg/m     Patient Vitals for the past 24 hrs:   BP Temp Temp src Pulse Heart Rate Resp SpO2 Oximeter Heart Rate   12/28/19 0808 100/66 97.6  F (36.4  C) Oral 58 -- 16 -- --   12/28/19 0625 -- -- -- -- -- 18 98 % --   12/28/19 0405 97/62 97.5  F (36.4  C) Oral -- 68 16 98 % --   12/28/19 0104 -- -- -- -- -- 16 97 % --   12/28/19 0006 103/68 97.6  F (36.4  C) Oral -- 62 18 96 % --   12/27/19 2212 -- -- -- -- -- 16 100 % --   12/27/19 2008 124/83 97.4  F (36.3  C) Oral -- 61 18 99 % --   12/27/19 1912 -- -- -- -- -- 16 96 % --   12/27/19 1700 100/58 97.7  F (36.5  C) -- 55 60 16 98 % --   12/27/19 1600 116/50 97.5  F (36.4  C) Oral 55 -- 16 99 % --   12/27/19 1459 109/68 -- -- 58 -- 16 97 % --   12/27/19 1400 108/68 -- -- 62 -- 16 97 % --   12/27/19 1257 95/59 -- -- 57 -- 16 97 % --   12/27/19 1229 105/62 97.7  F (36.5  C) Oral 53 -- 16 97 % --   12/27/19 1200 99/52 -- -- 54 60 18 97 % --   12/27/19 1145 108/70 -- -- -- 57 16 95 % --   12/27/19 1130 96/64 -- -- -- 60 18 96 % 61 bpm   12/27/19 1115 94/62 -- -- 66 68 13 97 % 73 bpm   12/27/19 1100 97/62 -- -- 57 63 10 96 % 63 bpm   12/27/19 1045 95/57 -- -- 67 74 15 95 % 76 bpm   12/27/19 1030 90/63 -- -- 80 67 17 93 % 67 bpm   12/27/19 1019 97/60 97.1  F (36.2  C) Oral -- 75 18 97 % --     Gen: healthy, alert and no distress   Resp: Nonlabored breathing  Abd: soft, non-distended, appropriately TTP, FF at umb  Incision: C/D/I, staples in place   Ext: non-tender, no edema    Hemoglobin   Date Value Ref Range Status   12/27/2019 12.0 11.7 - 15.7 g/dL Final "   2017 9.3 (L) 11.7 - 15.7 g/dL Final     Lab Results   Component Value Date    ABO A 2019    RH Pos 2019    AS Neg 2019     Syphilis NR  A: 39 year old  POD#1 s/p repeat CS   Pain well controlled  Rh+  P:   Routine post-operative care  Ambulation encouraged  Lactation consultation  Reportable signs and symptoms dicussed with the patient  Pain control measures discussed  Check hgb, iron as needed    Samia Tovar MD   Obstetrics, Gynecology, and Infertility

## 2019-12-28 NOTE — PLAN OF CARE
VSS. Black w/ adequate output pulled at 0415, DTV. Scant vag bleeding. Incision dressing CDI. Pain controlled w/ tylenol and toradol. Breastfeeding well when infant awake.  Up ambulating in halls during evening and to bathroom x1 overnight, SBA.

## 2019-12-28 NOTE — PLAN OF CARE
Vitals stable. Feels well. Oral pain medications working well. Showered today. Dressing removed and steristrips replaced. Denies need for narcotics. Voiding in good amounts. Walking in hallways. Breast feeding without difficulty- although baby sleepy after circumcision.

## 2019-12-29 PROCEDURE — 25000132 ZZH RX MED GY IP 250 OP 250 PS 637: Performed by: OBSTETRICS & GYNECOLOGY

## 2019-12-29 PROCEDURE — 12000035 ZZH R&B POSTPARTUM

## 2019-12-29 RX ADMIN — POLYETHYLENE GLYCOL 3350 17 G: 17 POWDER, FOR SOLUTION ORAL at 08:18

## 2019-12-29 RX ADMIN — ACETAMINOPHEN 975 MG: 325 TABLET, FILM COATED ORAL at 08:17

## 2019-12-29 RX ADMIN — ACETAMINOPHEN 975 MG: 325 TABLET, FILM COATED ORAL at 01:19

## 2019-12-29 RX ADMIN — IBUPROFEN 600 MG: 600 TABLET ORAL at 13:35

## 2019-12-29 RX ADMIN — OXYCODONE HYDROCHLORIDE 5 MG: 5 TABLET ORAL at 06:11

## 2019-12-29 RX ADMIN — ACETAMINOPHEN 975 MG: 325 TABLET, FILM COATED ORAL at 16:30

## 2019-12-29 RX ADMIN — LEVOTHYROXINE SODIUM 125 MCG: 125 TABLET ORAL at 06:56

## 2019-12-29 RX ADMIN — IBUPROFEN 600 MG: 600 TABLET ORAL at 06:57

## 2019-12-29 RX ADMIN — IBUPROFEN 600 MG: 600 TABLET ORAL at 01:19

## 2019-12-29 RX ADMIN — OXYCODONE HYDROCHLORIDE 5 MG: 5 TABLET ORAL at 21:27

## 2019-12-29 RX ADMIN — IBUPROFEN 600 MG: 600 TABLET ORAL at 20:12

## 2019-12-29 RX ADMIN — SENNOSIDES AND DOCUSATE SODIUM 1 TABLET: 8.6; 5 TABLET ORAL at 20:12

## 2019-12-29 NOTE — PROGRESS NOTES
"[facility] Obstetrics Post-Op Progress Note  2019    S: Doing well.  Pain is well controlled. Bleeding Light. Infant is being .  Ambulatory.  Tolerating regular diet. Voiding spontaneously. Passing gas, no BM yet.    O:  /83   Pulse 60   Temp 97.4  F (36.3  C) (Oral)   Resp 16   Ht 1.549 m (5' 1\")   Wt 60.3 kg (133 lb)   LMP 2019   SpO2 98%   Breastfeeding Unknown   BMI 25.13 kg/m     Gen: healthy, alert and no distress   Resp: Nonlabored breathing  Abd: soft, non-distended, appropriately TTP, FF at u  Incision: C/D/I, staples in place   Ext: non-tender, no edema    Hemoglobin   Date Value Ref Range Status   2019 10.9 (L) 11.7 - 15.7 g/dL Final   2019 12.0 11.7 - 15.7 g/dL Final     Lab Results   Component Value Date    ABO A 2019    RH Pos 2019    AS Neg 2019       A: 39 year old  POD#2 s/p R LTCS   ABLA-asymptomatic    P:   Routine post-operative care  Ambulation encouraged  Breast feeding strategies discussed  Reportable signs and symptoms dicussed with the patient  Ok to just use PNV for iron supp  Anticipate discharge tomorrow and no Rx written as I had to [place] [facility]    Roopa Sexton, DO   Obstetrics, Gynecology, and Infertility      "

## 2019-12-29 NOTE — PLAN OF CARE
Vitals stable. Feels well. Incision healing. Up and about in halls. Oral pain medications working. Breast feeding without difficulty. Will continue to monitor.

## 2019-12-29 NOTE — LACTATION NOTE
Follow up visit with Sonya. She reports feeding is going well. She feels like her milk is starting to come in. Reviewed milk supply and engorgement. General questions answered regarding using a breast pump. Encouraged her to wait to start pumping to store milk until milk supply is well established, unless infant is not feeding well. Breastfeeding section reviewed in [facility] patient education booklet.    Feeding plan: Recommend unlimited, frequent breast feedings: At least 8 - 12 times every 24 hours. Avoid pacifiers and supplementation with formula unless medically indicated. Encouraged use of feeding log and to record feedings, and void/stool patterns. Sonya has a pump for home use.  Encouraged to call with needs, will revisit as needed. Sonya appreciative of visit.    Holly Song, BSN, PHN, RN-C, MNN, IBCLC

## 2019-12-29 NOTE — PLAN OF CARE
VSS. Voiding adequately on own. Scant vag bleeding. Pain controlled w/ oxycodone, ibuprofen, and tylenol. Breast feeding well independently.  Using lanolin on tender nipples.

## 2019-12-30 VITALS
BODY MASS INDEX: 25.11 KG/M2 | DIASTOLIC BLOOD PRESSURE: 79 MMHG | WEIGHT: 133 LBS | TEMPERATURE: 97.3 F | RESPIRATION RATE: 16 BRPM | HEIGHT: 61 IN | HEART RATE: 65 BPM | SYSTOLIC BLOOD PRESSURE: 120 MMHG | OXYGEN SATURATION: 98 %

## 2019-12-30 PROCEDURE — 25000132 ZZH RX MED GY IP 250 OP 250 PS 637: Performed by: OBSTETRICS & GYNECOLOGY

## 2019-12-30 RX ORDER — OXYCODONE HYDROCHLORIDE 5 MG/1
5-10 TABLET ORAL EVERY 4 HOURS PRN
Qty: 10 TABLET | Refills: 0 | Status: SHIPPED | OUTPATIENT
Start: 2019-12-30

## 2019-12-30 RX ORDER — IBUPROFEN 600 MG/1
600 TABLET, FILM COATED ORAL EVERY 6 HOURS PRN
Qty: 30 TABLET | Refills: 0 | Status: SHIPPED | OUTPATIENT
Start: 2019-12-30

## 2019-12-30 RX ORDER — LEVOTHYROXINE SODIUM 125 UG/1
125 TABLET ORAL DAILY
Qty: 30 TABLET | Refills: 0 | Status: SHIPPED | OUTPATIENT
Start: 2019-12-30

## 2019-12-30 RX ORDER — AMOXICILLIN 250 MG
1 CAPSULE ORAL 2 TIMES DAILY PRN
Qty: 30 TABLET | Refills: 0 | Status: SHIPPED | OUTPATIENT
Start: 2019-12-30

## 2019-12-30 RX ADMIN — IBUPROFEN 600 MG: 600 TABLET ORAL at 01:56

## 2019-12-30 RX ADMIN — LEVOTHYROXINE SODIUM 125 MCG: 125 TABLET ORAL at 06:43

## 2019-12-30 RX ADMIN — Medication 1 LOZENGE: at 01:53

## 2019-12-30 RX ADMIN — SENNOSIDES AND DOCUSATE SODIUM 2 TABLET: 8.6; 5 TABLET ORAL at 08:14

## 2019-12-30 RX ADMIN — ACETAMINOPHEN 975 MG: 325 TABLET, FILM COATED ORAL at 00:30

## 2019-12-30 RX ADMIN — ACETAMINOPHEN 650 MG: 325 TABLET, FILM COATED ORAL at 08:15

## 2019-12-30 RX ADMIN — IBUPROFEN 600 MG: 600 TABLET ORAL at 08:15

## 2019-12-30 RX ADMIN — POLYETHYLENE GLYCOL 3350 17 G: 17 POWDER, FOR SOLUTION ORAL at 08:15

## 2019-12-30 NOTE — PLAN OF CARE
VSS. Voiding adequately on own. Scant vag bleeding. Pain controlled w/ ibuprofen and tylenol, and oxy PRN. Breast feeding well independently, latch checked- looks great. Nipples getting tender, declined lanolin. Using cough drops for non-productive cough.
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
## 2019-12-30 NOTE — PROGRESS NOTES
SPIRITUAL HEALTH SERVICES  Spiritual Assessment Progress Note  [facility] 44  Postpartum Referral;   met with pt and pt's mother.  Pt openly and tearfully talked about her marriage and the control dynamics between her and her . Pt has many fears and concerns.  SH processed her thoughts and feelings with SH.  Pt stated that she wanted to have her children (has 2yo at home) baptized and her  prohibited it.  Pt asked if I would baptized her  son - and I agreed to do it.  I informed pt that anyone can baptize and that if she wanted to, she could baptize her 3 yo daughter at home.   Pt stated that she is working with a therapist and a  but has not made a decision as to what she will do next.  Pt's mother is here from [place] and is very supportive of pt and states that pt's whole family is there to support her should she leave her .     listened to pt's story and provided emotional and spiritual support. SH baptized infant son and gave pt a seashell, a pottery heart and a comfort cross as symbols of the event and spiritual support.      Pt plans to discharge today so no follow up is needed.                                                                                                                                               Prerna Sellers M.Div., [facility]  Staff    Pager [phone]

## 2019-12-30 NOTE — PLAN OF CARE
Vitals stable. Dry cough today. Incision healing.Breast feeding without difficulty.  here today per her request. Oral pain medications working. Ready for discharge home with baby.

## 2019-12-30 NOTE — PLAN OF CARE
The pt reports pain adequately-controlled with Tylenol/Ibuprofen (Oxycodone PRN) and she's using the ABD binder with ambulation.  She continues working on breastfeeding on demand, she's independent with positioning, and latch verified.  She was encouraged to ensure her son keeps his bottom lip rolled down and out.  The pt requested  services before discharge; a consult has been ordered.  Discharge expected in the AM

## 2019-12-30 NOTE — LACTATION NOTE
Routine visit with Sonya, her mother and baby.    Continues to nurse well per mom.  Has a breast pump for home and plans to follow up with [facility].  Getting ready for discharge.  Plan: Watch for feeding cues and feed every 2-3 hours and/or on demand. Continue to use feeding log to track intake and appropriate voids and stools. Take feeding log to first follow up appointment or weight check. Encourage skin to skin to promote frequent feedings, thermoregulation and bonding. Follow-up with healthcare provider or lactation consultant for questions or concerns.     Instructed on signs/symptoms of engorgement/ plugged ducts and mastitis.  Instructed on comfort measures and when to call MD.   No further questions at this time.   Will follow as needed.   Sydnie ESTRADAN, RN, PHN, RNC-MNN, IBCLC

## 2020-01-10 NOTE — DISCHARGE SUMMARY
[facility] Discharge Summary    Sonya Boyd MRN# [id number]   Age: 39 year old YOB: 1980     Date of Admission:  2019  Date of Discharge::  2019 12:57 PM  Admitting Physician:  Cailin Hawk MD  Discharge Physician:  Noelle Mckeon MD     Home clinic: Obstetrics, Gynecology, and Infertility          Admission Diagnoses:   1. Intrauterine pregnancy at 39w1d   2. Prior  delivery  3. Hypothyroidism          Discharge Diagnosis:   1.  delivery at 39w1d   2. As above          Procedures:   Repeat low transverse  section via pfannenstiel skin incision with two layer uterine closure           Medications Prior to Admission:     No medications prior to admission.             Discharge Medications:     Discharge Medication List as of 2019  9:30 AM      START taking these medications    Details   ibuprofen (ADVIL/MOTRIN) 600 MG tablet Take 1 tablet (600 mg) by mouth every 6 hours as needed for other (cramping), Disp-30 tablet, R-0, E-Prescribe      oxyCODONE (ROXICODONE) 5 MG tablet Take 1-2 tablets (5-10 mg) by mouth every 4 hours as needed, Disp-10 tablet, R-0, Local Print      senna-docusate (SENOKOT-S/PERICOLACE) 8.6-50 MG tablet Take 1 tablet by mouth 2 times daily as needed for constipation, Disp-30 tablet, R-0, E-Prescribe         CONTINUE these medications which have CHANGED    Details   levothyroxine (SYNTHROID) 125 MCG tablet Take 1 tablet (125 mcg) by mouth daily, Disp-30 tablet, R-0, E-Prescribe         CONTINUE these medications which have NOT CHANGED    Details   Prenatal Vit-Fe Fumarate-FA (PRENATAL MULTIVITAMIN  PLUS IRON) 27-0.8 MG TABS Take 1 tablet by mouth daily, Historical                    Brief History of Labor or Admission:   Sonya Boyd is a 39 year old  who was admitted at 39w1d for scheduled  delivery.           Hospital Course:   The patient's hospital course was unremarkable.  She recovered  as anticipated and experienced no post-operative complications.  On discharge, her pain was well controlled. Vaginal bleeding is similar to peak menstrual flow.  Voiding without difficulty.  Ambulating well and tolerating a normal diet.  No fever or significant wound drainage.  Breastfeeding well.  Infant is stable.  No bowel movement yet.  She was discharged on post-partum day #3.     Post-partum hemoglobin:   Hemoglobin   Date Value Ref Range Status   12/28/2019 10.9 (L) 11.7 - 15.7 g/dL Final             Discharge Instructions and Follow-Up:   Discharge diet: Regular   Discharge activity: No heavy lifting, pushing, pulling for 6 week(s)  No driving or operating machinery while on narcotic analgesics  Pelvic rest: abstain from intercourse and do not use tampons for 6 week(s)   Discharge follow-up: Incision check in 2 weeks  Routine postpartum visit in 6 weeks   Wound care: Keep wound clean and dry  May shower but do not soak incision or scrub  Steri-strips off in 7 days             Discharge Disposition:   Discharged to home      Cailin Hawk MD   [facility]

## 2020-12-27 ENCOUNTER — HEALTH MAINTENANCE LETTER (OUTPATIENT)
Age: 40
End: 2020-12-27

## 2021-10-04 ENCOUNTER — HEALTH MAINTENANCE LETTER (OUTPATIENT)
Age: 41
End: 2021-10-04

## 2022-01-23 ENCOUNTER — HEALTH MAINTENANCE LETTER (OUTPATIENT)
Age: 42
End: 2022-01-23

## 2022-09-11 ENCOUNTER — HEALTH MAINTENANCE LETTER (OUTPATIENT)
Age: 42
End: 2022-09-11

## 2023-04-30 ENCOUNTER — HEALTH MAINTENANCE LETTER (OUTPATIENT)
Age: 43
End: 2023-04-30

## 2024-02-24 ENCOUNTER — HEALTH MAINTENANCE LETTER (OUTPATIENT)
Age: 44
End: 2024-02-24

## (undated) DEVICE — TAPE MEDIPORE 4"X10YD 2964

## (undated) DEVICE — BLADE CLIPPER 4406

## (undated) DEVICE — LINEN C-SECTION 5415

## (undated) DEVICE — CATH TRAY FOLEY 16FR BARDEX W/DRAIN BAG STATLOCK 300316A

## (undated) DEVICE — DRSG TELFA 3X8" 1238

## (undated) DEVICE — DRSG GAUZE 4X4" TOPPER

## (undated) DEVICE — SUCTION CANISTER MEDIVAC LINER 3000ML W/LID 65651-530

## (undated) DEVICE — SU VICRYL 0 CT 36" J358H

## (undated) DEVICE — PACK C-SECTION LF PL15OTA83B

## (undated) DEVICE — DRSG STERI STRIP 1/4X3" R1541

## (undated) DEVICE — ESU GROUND PAD UNIVERSAL W/O CORD

## (undated) DEVICE — SU MONOCRYL 4-0 PS-2 18" UND Y496G

## (undated) DEVICE — GLOVE PROTEXIS BLUE W/NEU-THERA 6.5  2D73EB65

## (undated) DEVICE — PREP CHLORAPREP 26ML TINTED ORANGE  260815

## (undated) DEVICE — SOL NACL 0.9% IRRIG 1000ML BOTTLE 2F7124

## (undated) DEVICE — GLOVE ORTHO 7 LATEX

## (undated) DEVICE — SU MONOCRYL 0 CT-1 36" UND Y946H

## (undated) DEVICE — SU VICRYL 3-0 SH 27" J316H